# Patient Record
Sex: MALE | URBAN - METROPOLITAN AREA
[De-identification: names, ages, dates, MRNs, and addresses within clinical notes are randomized per-mention and may not be internally consistent; named-entity substitution may affect disease eponyms.]

---

## 2021-11-22 ENCOUNTER — OFFICE VISIT (OUTPATIENT)
Dept: URBAN - METROPOLITAN AREA CLINIC 6 | Facility: CLINIC | Age: 66
End: 2021-11-22
Payer: MEDICARE

## 2021-11-22 DIAGNOSIS — H49.20 SIXTH NERVE PALSY, UNSPECIFIED EYE: Primary | ICD-10-CM

## 2021-11-22 DIAGNOSIS — H35.62 RETINAL HEMORRHAGE, LEFT EYE: ICD-10-CM

## 2021-11-22 DIAGNOSIS — H25.813 COMBINED FORMS OF AGE-RELATED CATARACT, BILATERAL: ICD-10-CM

## 2021-11-22 PROCEDURE — 92004 COMPRE OPH EXAM NEW PT 1/>: CPT | Performed by: OPTOMETRIST

## 2021-11-22 PROCEDURE — 92250 FUNDUS PHOTOGRAPHY W/I&R: CPT | Performed by: OPTOMETRIST

## 2021-11-22 ASSESSMENT — INTRAOCULAR PRESSURE
OS: 17
OD: 20

## 2021-11-22 NOTE — IMPRESSION/PLAN
Impression: Retinal hemorrhage, left eye: H35.62. Plan: Dot / blot hemorrhages in the retina of the left eye only. May be ocular ischemic syndrome. Recommend carotid doppler / work up.

## 2021-11-22 NOTE — IMPRESSION/PLAN
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 Plan: Sixth nerve palsy - likely from diabetes, HTN, cardiovascular issues / microvascular infarct. Recommend patching the RT eye to avoid diplopia until recovers.

## 2021-11-22 NOTE — IMPRESSION/PLAN
Impression: Combined forms of age-related cataract, bilateral: H25.813. Plan: Cataract: Observe for now without intervention until nerve palsy resolves.